# Patient Record
(demographics unavailable — no encounter records)

---

## 2025-03-20 NOTE — PHYSICAL EXAM
[Chaperone Present] : A chaperone was present in the examining room during all aspects of the physical examination [Oriented x3] : oriented x3 [Examination Of The Breasts] : a normal appearance [No Discharge] : no discharge [No Masses] : no breast masses were palpable [No Bleeding] : There was no active vaginal bleeding [Normal] : normal [Normal Position] : in a normal position [Uterine Adnexae] : normal [FreeTextEntry2] : Appropriately responsive, alert, and no acute distress. [FreeTextEntry3] : Thyroid is non-enlarged, nontender. No palpable nodules or goiter. No lymphadenopathy. [FreeTextEntry7] : Soft. Nondistended. Nontender. No rebound or guarding. There are no palpable masses. [Vulvar Atrophy] : vulvar atrophy [Atrophy] : atrophy [FreeTextEntry1] : External genitalia are within normal limits with no lesions visualized. [FreeTextEntry4] : No vaginal discharge, blood, or any lesions noted within the vaginal vault. [FreeTextEntry5] : A speculum was inserted without any difficulty. The cervix was normal in appearance. Pap smear was obtained. No cervical motion tenderness. [FreeTextEntry6] : Bimanual examination was notable for a normal, nontender uterus. There were no palpable adnexal masses or adnexal tenderness. [FreeTextEntry9] : No lesions. No palpable masses.

## 2025-03-20 NOTE — HISTORY OF PRESENT ILLNESS
[Patient reported mammogram was normal] : Patient reported mammogram was normal [Patient reported breast sonogram was normal] : Patient reported breast sonogram was normal [Patient reported PAP Smear was normal] : Patient reported PAP Smear was normal [postmenopausal] : postmenopausal [N] : Patient is not sexually active [Y] : Positive pregnancy history [Post-Menopause, No Sxs] : post-menopausal, currently without symptoms [Menopause Age: ____] : age at menopause was [unfilled] [No] : Patient does not have concerns regarding sex [Previously active] : previously active [TextBox_4] : 58-year-old  3 para 0 postmenopausal presents for an annual examination. Review of systems are negative. [Mammogramdate] : 02/2025 [TextBox_19] : Done at [facility] [BreastSonogramDate] : 02/2025 [TextBox_25] : Done at [facility] [PapSmeardate] : 03/15/2024 [LMPDate] : 2018 [PGHxTotal] : 3 [PGHxAbortions] : 1 [BannerxLiving] : 0 [PGHxABSpont] : 2 [FreeTextEntry1] : 2018

## 2025-03-20 NOTE — PLAN
[FreeTextEntry1] : Wellness exam. Normal physical examination. Recommendations: Dental and dermatological examinations.   Status post mammography and bilateral breast ultrasound in February 2025 at [facility] in [place]. Status post ophthalmological examination one week ago. She has an upcoming dental and dermatological examination scheduled in April.   Discussed proper nutrition and physical exercise. Reviewed age-appropriate vaccinations.